# Patient Record
Sex: MALE | Race: BLACK OR AFRICAN AMERICAN | URBAN - METROPOLITAN AREA
[De-identification: names, ages, dates, MRNs, and addresses within clinical notes are randomized per-mention and may not be internally consistent; named-entity substitution may affect disease eponyms.]

---

## 2017-06-06 ENCOUNTER — TELEPHONE (OUTPATIENT)
Dept: INTERNAL MEDICINE | Facility: CLINIC | Age: 57
End: 2017-06-06

## 2017-06-06 NOTE — TELEPHONE ENCOUNTER
Pt's wife walks-in (consent to communicate in epic), reports pt is currently in the [place] d/t an immigration issue. States he came to the [place] as student over 10 years ago and they got  (she is a [place] citizen). Indicates they've been working on paperwork for over a year to keep [place] in [place]. At first immigration was telling him he needed to go to [facility] to complete immigration status, but pt hadn't lived there for 20 years. Pt is a [place] citizen so was instructed to go to the [place] to continue immigration process. Pt had his medical interview for immigration today. The interviewer is asking for a short 1 page medical summary for pt.     Wife will  letter and get emailed to pt. Wife reports it's a high priority to allow immigration process to proceed.    Wife's phone number: [phone]    Please advise, thanks.

## 2017-06-06 NOTE — TELEPHONE ENCOUNTER
I have not seen patient since 2013. Need to see him for assessment and letter for his medical conditions.

## 2017-06-07 NOTE — TELEPHONE ENCOUNTER
Pt's wife called back-Stated pt is in [place], and needs to info for immigration so he can come here. Wife stated it does not matter how old the med rec are, they just need to be emailed to the [place]-email below     Emailed 2013 med rec      [email or web address]